# Patient Record
Sex: MALE | Race: WHITE | NOT HISPANIC OR LATINO | URBAN - METROPOLITAN AREA
[De-identification: names, ages, dates, MRNs, and addresses within clinical notes are randomized per-mention and may not be internally consistent; named-entity substitution may affect disease eponyms.]

---

## 2023-05-18 ENCOUNTER — PRE VISIT (OUTPATIENT)
Dept: PSYCHOLOGY | Facility: CLINIC | Age: 53
End: 2023-05-18
Payer: COMMERCIAL

## 2023-05-18 NOTE — TELEPHONE ENCOUNTER
"Date: 2023    Client Name:  Tolu Winchester  Preferred Name: Tolu  MRN: [id number]   : 1970  Age:  52 year old    Presenting Problem / Reason for Assessment:     Concerns for compulsive sexual behavior    What is your goal/ hoped-for outcome for services? Would like to know if sexual behaviors would be considered \"sex addiction\"     Referring:     Self Referred    Suggested Program:  CSB    Interested in Couples/Family Therapy?      Any current or past legal concerns we would need to know about?:   No    If patient is a minor please indicate parent(s) have physical custody: NA and/or legal custody NA  Or if patient is in foster care, who is : NA and who has custody (usually the [facility]): NA    Patient wishes to be contacted regarding Insurance benefits?:  No    Insurance Benefits to be evaluated.  Note will be entered when validated.    Please Verify Registration    Packet not sent, phone screen scheduled with Chapin Connelly      "

## 2023-06-01 ENCOUNTER — PATIENT OUTREACH (OUTPATIENT)
Dept: CARE COORDINATION | Facility: CLINIC | Age: 53
End: 2023-06-01

## 2023-06-01 ENCOUNTER — APPOINTMENT (OUTPATIENT)
Dept: CARE COORDINATION | Facility: CLINIC | Age: 53
End: 2023-06-01
Payer: COMMERCIAL

## 2023-06-01 NOTE — PROGRESS NOTES
Called pt to complete compulsive sexual behavior (CSB) screening. Pt did not answer and this worker was unable to leave a voicemail. Waited an additional 10 minutes for potential call back. However, will not be able to complete screening today if pt does call back. This worker will conduct no further outreaches.    FAIZA Herbert  Social Work Care Coordinator  [facility]  [phone]  [email or web address]  Pronouns: They/He

## 2023-06-08 ENCOUNTER — APPOINTMENT (OUTPATIENT)
Dept: CARE COORDINATION | Facility: CLINIC | Age: 53
End: 2023-06-08
Payer: COMMERCIAL

## 2023-06-08 ENCOUNTER — PATIENT OUTREACH (OUTPATIENT)
Dept: CARE COORDINATION | Facility: CLINIC | Age: 53
End: 2023-06-08

## 2023-06-08 NOTE — PROGRESS NOTES
Called pt for CSB programming treatment. Behaviors/concerns do not seem in line with needs for CSB treatment. This worker will follow up in a week per pt request.    FAIZA Herbert  Social Work Care Coordinator  [facility]  [phone]  [email or web address]  Pronouns: They/He

## 2023-06-20 ENCOUNTER — PATIENT OUTREACH (OUTPATIENT)
Dept: CARE COORDINATION | Facility: CLINIC | Age: 53
End: 2023-06-20
Payer: COMMERCIAL

## 2023-06-20 NOTE — PROGRESS NOTES
Clinic Care Coordination Contact  [facility]/Voicemail       Clinical Data: Care Coordinator Outreach  Outreach attempted x 2.  Unable to leave a voicemail.ator  Care Coordinator will do no further outreaches at this time due to pt not being ongoing with sexual and gender health clinic.    FAIZA Herbert  Social Work Care Coordinator  [facility]  [phone]  [email or web address]  Pronouns: They/He

## 2023-08-13 ENCOUNTER — HEALTH MAINTENANCE LETTER (OUTPATIENT)
Age: 53
End: 2023-08-13

## 2024-10-06 ENCOUNTER — HEALTH MAINTENANCE LETTER (OUTPATIENT)
Age: 54
End: 2024-10-06